# Patient Record
Sex: FEMALE | Race: WHITE | NOT HISPANIC OR LATINO
[De-identification: names, ages, dates, MRNs, and addresses within clinical notes are randomized per-mention and may not be internally consistent; named-entity substitution may affect disease eponyms.]

---

## 2018-10-17 ENCOUNTER — RX RENEWAL (OUTPATIENT)
Age: 48
End: 2018-10-17

## 2018-10-17 PROBLEM — Z00.00 ENCOUNTER FOR PREVENTIVE HEALTH EXAMINATION: Status: ACTIVE | Noted: 2018-10-17

## 2018-10-18 ENCOUNTER — RX RENEWAL (OUTPATIENT)
Age: 48
End: 2018-10-18

## 2019-05-28 ENCOUNTER — MEDICATION RENEWAL (OUTPATIENT)
Age: 49
End: 2019-05-28

## 2019-10-17 ENCOUNTER — RX RENEWAL (OUTPATIENT)
Age: 49
End: 2019-10-17

## 2020-03-09 ENCOUNTER — RX RENEWAL (OUTPATIENT)
Age: 50
End: 2020-03-09

## 2020-03-10 ENCOUNTER — APPOINTMENT (OUTPATIENT)
Dept: ENDOCRINOLOGY | Facility: CLINIC | Age: 50
End: 2020-03-10
Payer: COMMERCIAL

## 2020-03-10 ENCOUNTER — TRANSCRIPTION ENCOUNTER (OUTPATIENT)
Age: 50
End: 2020-03-10

## 2020-03-10 VITALS
OXYGEN SATURATION: 98 % | DIASTOLIC BLOOD PRESSURE: 86 MMHG | BODY MASS INDEX: 37.76 KG/M2 | SYSTOLIC BLOOD PRESSURE: 122 MMHG | WEIGHT: 200 LBS | HEIGHT: 61 IN | HEART RATE: 67 BPM

## 2020-03-10 DIAGNOSIS — N83.209 UNSPECIFIED OVARIAN CYST, UNSPECIFIED SIDE: ICD-10-CM

## 2020-03-10 DIAGNOSIS — Z83.79 FAMILY HISTORY OF OTHER DISEASES OF THE DIGESTIVE SYSTEM: ICD-10-CM

## 2020-03-10 DIAGNOSIS — Z83.49 FAMILY HISTORY OF OTHER ENDOCRINE, NUTRITIONAL AND METABOLIC DISEASES: ICD-10-CM

## 2020-03-10 PROCEDURE — 99213 OFFICE O/P EST LOW 20 MIN: CPT

## 2020-03-12 PROBLEM — Z83.49 FAMILY HISTORY OF HYPOTHYROIDISM: Status: ACTIVE | Noted: 2020-03-10

## 2020-03-12 PROBLEM — Z83.79 FAMILY HISTORY OF CELIAC DISEASE: Status: ACTIVE | Noted: 2020-03-10

## 2020-03-12 PROBLEM — N83.209 RUPTURED OVARIAN CYST: Status: RESOLVED | Noted: 2020-03-10 | Resolved: 2020-03-12

## 2020-03-12 NOTE — HISTORY OF PRESENT ILLNESS
[FreeTextEntry1] : Pt is a 50 y/o F here for follow up of Hypothyroidism. \par She is feeling well today. \par \par Periods have been lighter with Junel. Has not started menopause. Following with GYN. \par \par She recently got hearing aids. \par She has some constipation sometimes. \par \par Denies voice changes, neck pain, dysphagia, heart racing, light headedness, dizziness, CP< pressure, SOB, abd pain, n/v/d, LE swelling, or other complaints.

## 2020-03-12 NOTE — ASSESSMENT
[FreeTextEntry1] : 50 y/o F here for follow up. \par \par Hypothyroidism:\par -Continue Levothyroxine \par -UTD with thyroid sono. \par \par Impaired fasting glucose of 112, last A1c was 5.2. - Encouraged more aggressive lifestyle and dietary changes. \par \par High cholesterol - avoid high fat foods, dairy and fried food. Recommended food like oatmeal and good fats like omega 3s. \par \par Encouraged to reduce cinnamon supplements for risk of blood thinning properties. \par \par FU here in 6 months.

## 2020-03-12 NOTE — PHYSICAL EXAM
[Alert] : alert [No Acute Distress] : no acute distress [Well Nourished] : well nourished [Well Developed] : well developed [Normal Sclera/Conjunctiva] : normal sclera/conjunctiva [EOMI] : extra ocular movement intact [No Proptosis] : no proptosis [Thyroid Not Enlarged] : the thyroid was not enlarged [No Thyroid Nodules] : there were no palpable thyroid nodules [No Respiratory Distress] : no respiratory distress [No Accessory Muscle Use] : no accessory muscle use [Clear to Auscultation] : lungs were clear to auscultation bilaterally [Normal Rate] : heart rate was normal  [Normal S1, S2] : normal S1 and S2 [Regular Rhythm] : with a regular rhythm [Pedal Pulses Normal] : the pedal pulses are present [No Edema] : there was no peripheral edema [Normal Bowel Sounds] : normal bowel sounds [Not Tender] : non-tender [Soft] : abdomen soft [Not Distended] : not distended [Post Cervical Nodes] : posterior cervical nodes [Anterior Cervical Nodes] : anterior cervical nodes [Axillary Nodes] : axillary nodes [Normal] : normal and non tender [No Spinal Tenderness] : no spinal tenderness [Spine Straight] : spine straight [No Stigmata of Cushings Syndrome] : no stigmata of cushings syndrome [Normal Gait] : normal gait [Normal Strength/Tone] : muscle strength and tone were normal [No Rash] : no rash [Normal Reflexes] : deep tendon reflexes were 2+ and symmetric [No Tremors] : no tremors [Oriented x3] : oriented to person, place, and time [Acanthosis Nigricans] : no acanthosis nigricans

## 2020-03-12 NOTE — ADDENDUM
[FreeTextEntry1] : Documented by Ly Chaney acting as a scribe for Dr. Arleen Perdue. 03/10/2020 \par \par All medical record entries made by the Scribe were at my, Dr. Arleen Perdue, direction and personally dictated by me on 03/10/2020 . I have reviewed the chart and agree that the record accurately reflects my personal performance of the history, physical exam, assessment and plan. I have also personally directed, and reviewed the chart and I edited where appropriate.

## 2020-05-18 ENCOUNTER — RX RENEWAL (OUTPATIENT)
Age: 50
End: 2020-05-18

## 2020-11-24 ENCOUNTER — APPOINTMENT (OUTPATIENT)
Dept: ENDOCRINOLOGY | Facility: CLINIC | Age: 50
End: 2020-11-24
Payer: COMMERCIAL

## 2020-11-24 PROCEDURE — 99214 OFFICE O/P EST MOD 30 MIN: CPT | Mod: 95

## 2020-11-29 NOTE — REASON FOR VISIT
[Follow-Up: _____] : a [unfilled] follow-up visit [Home] : at home, [unfilled] , at the time of the visit. [Medical Office: ([facility])___] : at the medical office located in

## 2020-11-29 NOTE — ASSESSMENT
[FreeTextEntry1] : \par \par Hypothyroidism:\par -Continue Levothyroxine \par -UTD with thyroid sono. \par \par Impaired fasting glucose ocont diet and exercise\par \par  INC LFT - new \par  see PMD  ? due to OCP  ? supplement  ? gallstones  \par \par \par \par Low Vit D - take QOD as levels gettign towards ULN \par High cholesterol - avoid high fat foods, dairy and fried food. Recommended food like oatmeal and good fats like omega 3s. \par \par Encouraged to reduce cinnamon supplements for risk of blood thinning properties. \par \par FU here in 6 months.

## 2020-11-29 NOTE — HISTORY OF PRESENT ILLNESS
[FreeTextEntry1] : telehealth follow up \par startt manny 459 pm \par  end time 521 pm \par  follow up of Hypothyroidism. \par went on Nutrisystem - lsot 25 lbs   nfeelign better  more energetcuc \par \par on different supplements  alfalfa  CAlcium  MVI  flaxseed oil \par \par \par . \par \par Denies voice changes, neck pain, dysphagia, heart racing, light headedness, dizziness, CP< pressure, SOB, abd pain, n/v/d, LE swelling, or other complaints.

## 2021-06-15 ENCOUNTER — NON-APPOINTMENT (OUTPATIENT)
Age: 51
End: 2021-06-15

## 2021-06-25 LAB
HBA1C MFR BLD HPLC: 5.2
LDLC SERPL DIRECT ASSAY-MCNC: 115

## 2021-06-28 ENCOUNTER — APPOINTMENT (OUTPATIENT)
Dept: ENDOCRINOLOGY | Facility: CLINIC | Age: 51
End: 2021-06-28
Payer: COMMERCIAL

## 2021-06-28 VITALS
DIASTOLIC BLOOD PRESSURE: 60 MMHG | WEIGHT: 153 LBS | HEIGHT: 61 IN | BODY MASS INDEX: 28.89 KG/M2 | HEART RATE: 68 BPM | SYSTOLIC BLOOD PRESSURE: 100 MMHG | OXYGEN SATURATION: 98 %

## 2021-06-28 PROCEDURE — 99213 OFFICE O/P EST LOW 20 MIN: CPT

## 2021-06-28 PROCEDURE — 99072 ADDL SUPL MATRL&STAF TM PHE: CPT

## 2021-06-28 NOTE — REASON FOR VISIT
[Home] : at home, [unfilled] , at the time of the visit. [Medical Office: ([facility])___] : at the medical office located in  [Follow-Up: _____] : a [unfilled] follow-up visit

## 2021-06-30 NOTE — ASSESSMENT
[FreeTextEntry1] : \par \par Hypothyroidism:\par -Continue Levothyroxine \par -UTD with thyroid sono. \par \par Impaired fasting glucose ocont diet and exercise\par \par  INC LFT -improved  did see PMD \par  see PMD  ? due to OCP  ? supplement  ? gallstones  \par \par \par \par Low Vit D - take QOD as levels gettign towards ULN \par High cholesterol - avoid high fat foods, dairy and fried food. Recommended food like oatmeal and good fats like omega 3s. \par  \par \par FU here in 6 months.

## 2021-06-30 NOTE — HISTORY OF PRESENT ILLNESS
[FreeTextEntry1] :  \par  follow up of Hypothyroidism. \par went on Nutrisystem - lsot 25 lbs   nfeelign better  more energetcuc \par \par on different supplements  alfalfa  CAlcium  MVI  flaxseed oil \par \par \par . \par \par Denies voice changes, neck pain, dysphagia, heart racing, light headedness, dizziness, CP< pressure, SOB, abd pain, n/v/d, LE swelling, or other complaints.

## 2021-12-29 LAB
HBA1C MFR BLD HPLC: 5.1
LDLC SERPL CALC-MCNC: 135
MICROALBUMIN/CREAT 24H UR-RTO: <6

## 2021-12-30 ENCOUNTER — APPOINTMENT (OUTPATIENT)
Dept: ENDOCRINOLOGY | Facility: CLINIC | Age: 51
End: 2021-12-30
Payer: COMMERCIAL

## 2021-12-30 DIAGNOSIS — E63.8 OTHER SPECIFIED NUTRITIONAL DEFICIENCIES: ICD-10-CM

## 2021-12-30 PROCEDURE — 99214 OFFICE O/P EST MOD 30 MIN: CPT | Mod: 95

## 2022-01-09 NOTE — HISTORY OF PRESENT ILLNESS
[Home] : at home, [unfilled] , at the time of the visit. [Medical Office: ([facility])___] : at the medical office located in  [Verbal consent obtained from patient] : the patient, [unfilled] [FreeTextEntry1] :  start time 258 pm\par  end 321PM \par  \par  follow up of Hypothyroidism. \par went on Nutrisystem - lsot 25 lbs   nfeelign better  more energetcuc \par \par also using ashwaghanda  fish oil \par  Vit C \par   some hoase voice \par \par on different supplements  alfalfa  CAlcium  MVI  flaxseed oil \par \par \par . \par \par Denies voice changes, neck pain, dysphagia, heart racing, light headedness, dizziness, CP< pressure, SOB, abd pain, n/v/d, LE swelling, or other complaints.

## 2022-01-09 NOTE — ASSESSMENT
[FreeTextEntry1] : \par \par Hypothyroidism:\par -Continue Levothyroxine \par \par elevated Vit D- hol d all suppleemtns- repeat end of JAn \par  \par -UTD with thyroid sono. \par \par Impaired fasting glucose ocont diet and exercise\par \par  INC LFT -improved  did see PMD \par  see PMD  ? due to OCP  ? supplement  ? gallstones  \par \par \par \par High cholesterol - avoid high fat foods, dairy and fried food. Recommended food like oatmeal and good fats like omega 3s. \par  \par \par FU here in 6 months.

## 2022-01-09 NOTE — END OF VISIT
[Time Spent: ___ minutes] : I have spent [unfilled] minutes of time on the encounter. Principal Discharge DX:	Chest wall pain

## 2022-05-26 ENCOUNTER — RX RENEWAL (OUTPATIENT)
Age: 52
End: 2022-05-26

## 2022-06-30 ENCOUNTER — APPOINTMENT (OUTPATIENT)
Dept: ENDOCRINOLOGY | Facility: CLINIC | Age: 52
End: 2022-06-30

## 2022-06-30 PROCEDURE — 99214 OFFICE O/P EST MOD 30 MIN: CPT | Mod: 95

## 2022-07-04 NOTE — ASSESSMENT
[FreeTextEntry1] : \par \par Hypothyroidism:\par -Continue Levothyroxine \par \par elevated Vit D- hol d all suppleemtns- now at 93-- not taking anything\par  \par -UTD with thyroid sono. \par \par Impaired fasting glucose ocont diet and exercise\par \par  INC LFT -improved  did see PMD \par  see PMD  ? due to OCP  ? supplement  ? gallstones  \par \par \par \par High cholesterol - avoid high fat foods, dairy and fried food. Recommended food like oatmeal and good fats like omega 3s. \par  \par \par FU here in 6 months.

## 2022-07-04 NOTE — HISTORY OF PRESENT ILLNESS
[Home] : at home, [unfilled] , at the time of the visit. [Medical Office: ([facility])___] : at the medical office located in  [Verbal consent obtained from patient] : the patient, [unfilled] [FreeTextEntry1] :  start gumv553 pm\par  end 402PM \par  \par  follow up of Hypothyroidism. \par on LT4 0.112 mg qd \par went on Nutrisystem - lsot 25 lbs   nfeelign better  more energetcuc \par \par \par Denies voice changes, neck pain, dysphagia, heart racing, light headedness, dizziness, CP< pressure, SOB, abd pain, n/v/d, LE swelling, or other complaints.

## 2022-11-09 ENCOUNTER — RX RENEWAL (OUTPATIENT)
Age: 52
End: 2022-11-09

## 2023-03-23 ENCOUNTER — NON-APPOINTMENT (OUTPATIENT)
Age: 53
End: 2023-03-23

## 2023-03-24 ENCOUNTER — APPOINTMENT (OUTPATIENT)
Dept: ENDOCRINOLOGY | Facility: CLINIC | Age: 53
End: 2023-03-24
Payer: COMMERCIAL

## 2023-03-24 PROCEDURE — 99214 OFFICE O/P EST MOD 30 MIN: CPT | Mod: 95

## 2023-03-26 NOTE — ASSESSMENT
[FreeTextEntry1] : \par \par Hypothyroidism:\par -Continue Levothyroxine \par \par elevated Vit D- off suppleemnts- recheck levels \par  \par -UTD with thyroid sono. \par \par Impaired fasting glucose cont diet and exercise\par \par  INC LFT -improved  did see PMD \par  see PMD  ? due to OCP  ? supplement  ? gallstones  \par \par \par \par High cholesterol - avoid high fat foods, dairy and fried food. Recommended food like oatmeal and good fats like omega 3s. \par  \par \par FU here in 6 months.

## 2023-03-26 NOTE — HISTORY OF PRESENT ILLNESS
[Home] : at home, [unfilled] , at the time of the visit. [Medical Office: ([facility])___] : at the medical office located in  [Verbal consent obtained from patient] : the patient, [unfilled] [FreeTextEntry1] :  start time 210 pm\par  end 228PM \par  \par  follow up of Hypothyroidism. \par on LT4 0.112 mg qd \par \par \par \par Denies voice changes, neck pain, dysphagia, heart racing, light headedness, dizziness, CP< pressure, SOB, abd pain, n/v/d, LE swelling, or other complaints.

## 2024-01-16 ENCOUNTER — RX RENEWAL (OUTPATIENT)
Age: 54
End: 2024-01-16

## 2024-03-26 LAB
25(OH)D3 SERPL-MCNC: 92.7
TSH SERPL-ACNC: 0.79

## 2024-03-29 ENCOUNTER — APPOINTMENT (OUTPATIENT)
Dept: ENDOCRINOLOGY | Facility: CLINIC | Age: 54
End: 2024-03-29
Payer: COMMERCIAL

## 2024-03-29 VITALS — WEIGHT: 144 LBS | HEIGHT: 61 IN | BODY MASS INDEX: 27.19 KG/M2

## 2024-03-29 DIAGNOSIS — E67.3 HYPERVITAMINOSIS D: ICD-10-CM

## 2024-03-29 DIAGNOSIS — E03.9 HYPOTHYROIDISM, UNSPECIFIED: ICD-10-CM

## 2024-03-29 DIAGNOSIS — E53.8 DEFICIENCY OF OTHER SPECIFIED B GROUP VITAMINS: ICD-10-CM

## 2024-03-29 DIAGNOSIS — E04.2 NONTOXIC MULTINODULAR GOITER: ICD-10-CM

## 2024-03-29 DIAGNOSIS — E55.9 VITAMIN D DEFICIENCY, UNSPECIFIED: ICD-10-CM

## 2024-03-29 PROCEDURE — 99214 OFFICE O/P EST MOD 30 MIN: CPT

## 2024-03-29 RX ORDER — SILVER SULFADIAZINE 10 MG/G
1 CREAM TOPICAL
Qty: 50 | Refills: 0 | Status: DISCONTINUED | COMMUNITY
Start: 2022-10-05 | End: 2024-03-29

## 2024-03-29 RX ORDER — NORETHINDRONE ACETATE AND ETHINYL ESTRADIOL 1MG-20(21)
1-20 KIT ORAL
Qty: 84 | Refills: 0 | Status: DISCONTINUED | COMMUNITY
Start: 2022-03-21 | End: 2024-03-29

## 2024-03-29 RX ORDER — NORETHINDRONE ACETATE AND ETHINYL ESTRADIOL AND FERROUS FUMARATE 1MG-20(24)
KIT ORAL
Refills: 0 | Status: DISCONTINUED | COMMUNITY
End: 2024-03-29

## 2024-03-29 NOTE — DATA REVIEWED
[FreeTextEntry1] : Labs 3/13/2024 Total Cholesterol 229  Trig 75 HgbA1C 5.1% TSH 0.794 FT4 1.64 Vit D 92.7 Vit B12 1091

## 2024-03-29 NOTE — HISTORY OF PRESENT ILLNESS
[FreeTextEntry1] : follow up of Hypothyroidism. on LT4 0.112 mg qd   Denies voice changes, neck pain, dysphagia, heart racing, light headedness, dizziness, SOB, abd pain, n/v/d, LE swelling, or other complaints.

## 2024-03-29 NOTE — REASON FOR VISIT
[Follow - Up] : a follow-up visit [Hypothyroidism] : hypothyroidism [Home] : at home, [unfilled] , at the time of the visit. [Medical Office: ([facility])___] : at the medical office located in  [Patient] : the patient [Thyroid nodule/ MNG] : thyroid nodule/ MNG

## 2024-03-29 NOTE — REVIEW OF SYSTEMS
[Fatigue] : no fatigue [Visual Field Defect] : no visual field defect [Chest Pain] : no chest pain [Palpitations] : no palpitations [Nausea] : no nausea [Constipation] : no constipation [Vomiting] : no vomiting [Diarrhea] : no diarrhea [Polyuria] : no polyuria [Polydipsia] : no polydipsia

## 2024-03-29 NOTE — ASSESSMENT
[Levothyroxine] : The patient was instructed to take Levothyroxine on an empty stomach, separate from vitamins, and wait at least 30 minutes before eating [FreeTextEntry1] : Hypothyroidism: -Continue Levothyroxine   MNG U/S 3/15/2024 RUP 0.9 x 0.6 x 0.5 cm nodule, new LLP 0.6 x 0.4 x 0.5 cm nodule, increased in size Repeat thyroid u.s in 6 months    elevated Vit D- off suppleemnts-   Impaired fasting glucose cont diet and exercise HgbA1C 5.1%   High cholesterol - avoid high fat foods, dairy and fried food. not on statin   she lost 76 pounds over the past 3 years     FU here in 6 months.

## 2024-04-16 ENCOUNTER — RX RENEWAL (OUTPATIENT)
Age: 54
End: 2024-04-16

## 2024-04-16 RX ORDER — LEVOTHYROXINE SODIUM 0.11 MG/1
112 TABLET ORAL
Qty: 90 | Refills: 1 | Status: ACTIVE | COMMUNITY
Start: 2018-10-18 | End: 1900-01-01

## 2024-11-06 ENCOUNTER — RX RENEWAL (OUTPATIENT)
Age: 54
End: 2024-11-06

## 2025-08-22 ENCOUNTER — NON-APPOINTMENT (OUTPATIENT)
Age: 55
End: 2025-08-22

## 2025-08-25 ENCOUNTER — NON-APPOINTMENT (OUTPATIENT)
Age: 55
End: 2025-08-25

## 2025-08-27 ENCOUNTER — NON-APPOINTMENT (OUTPATIENT)
Age: 55
End: 2025-08-27

## 2025-08-27 ENCOUNTER — APPOINTMENT (OUTPATIENT)
Dept: OPHTHALMOLOGY | Facility: CLINIC | Age: 55
End: 2025-08-27
Payer: COMMERCIAL

## 2025-08-27 PROCEDURE — 92015 DETERMINE REFRACTIVE STATE: CPT

## 2025-08-27 PROCEDURE — 92004 COMPRE OPH EXAM NEW PT 1/>: CPT
